# Patient Record
Sex: FEMALE | Race: WHITE
[De-identification: names, ages, dates, MRNs, and addresses within clinical notes are randomized per-mention and may not be internally consistent; named-entity substitution may affect disease eponyms.]

---

## 2019-11-01 ENCOUNTER — HOSPITAL ENCOUNTER (EMERGENCY)
Dept: HOSPITAL 92 - ERS | Age: 59
Discharge: HOME | End: 2019-11-01
Payer: MEDICARE

## 2019-11-01 DIAGNOSIS — F32.9: ICD-10-CM

## 2019-11-01 DIAGNOSIS — F43.10: ICD-10-CM

## 2019-11-01 DIAGNOSIS — F41.9: ICD-10-CM

## 2019-11-01 DIAGNOSIS — F17.210: ICD-10-CM

## 2019-11-01 DIAGNOSIS — J18.9: Primary | ICD-10-CM

## 2019-11-01 LAB
ALBUMIN SERPL BCG-MCNC: 4.3 G/DL (ref 3.5–5)
ALP SERPL-CCNC: 94 U/L (ref 40–110)
ALT SERPL W P-5'-P-CCNC: 22 U/L (ref 8–55)
ANION GAP SERPL CALC-SCNC: 16 MMOL/L (ref 10–20)
AST SERPL-CCNC: 17 U/L (ref 5–34)
BASOPHILS # BLD AUTO: 0 THOU/UL (ref 0–0.2)
BASOPHILS NFR BLD AUTO: 0.5 % (ref 0–1)
BILIRUB SERPL-MCNC: 0.3 MG/DL (ref 0.2–1.2)
BUN SERPL-MCNC: 9 MG/DL (ref 9.8–20.1)
CALCIUM SERPL-MCNC: 9.7 MG/DL (ref 7.8–10.44)
CHLORIDE SERPL-SCNC: 103 MMOL/L (ref 98–107)
CO2 SERPL-SCNC: 25 MMOL/L (ref 22–29)
CREAT CL PREDICTED SERPL C-G-VRATE: 0 ML/MIN (ref 70–130)
EOSINOPHIL # BLD AUTO: 0 THOU/UL (ref 0–0.7)
EOSINOPHIL NFR BLD AUTO: 0.4 % (ref 0–10)
GLOBULIN SER CALC-MCNC: 3 G/DL (ref 2.4–3.5)
GLUCOSE SERPL-MCNC: 89 MG/DL (ref 70–105)
HGB BLD-MCNC: 13.8 G/DL (ref 12–16)
LEUKOCYTE ESTERASE UR QL STRIP.AUTO: 25 LEU/UL
LYMPHOCYTES # BLD: 2.3 THOU/UL (ref 1.2–3.4)
LYMPHOCYTES NFR BLD AUTO: 29.8 % (ref 21–51)
MCH RBC QN AUTO: 33.2 PG (ref 27–31)
MCV RBC AUTO: 97.5 FL (ref 78–98)
MONOCYTES # BLD AUTO: 0.5 THOU/UL (ref 0.11–0.59)
MONOCYTES NFR BLD AUTO: 6.3 % (ref 0–10)
NEUTROPHILS # BLD AUTO: 4.8 THOU/UL (ref 1.4–6.5)
NEUTROPHILS NFR BLD AUTO: 63 % (ref 42–75)
PLATELET # BLD AUTO: 241 THOU/UL (ref 130–400)
POTASSIUM SERPL-SCNC: 3.7 MMOL/L (ref 3.5–5.1)
RBC # BLD AUTO: 4.16 MILL/UL (ref 4.2–5.4)
RBC UR QL AUTO: (no result) HPF (ref 0–3)
SODIUM SERPL-SCNC: 140 MMOL/L (ref 136–145)
WBC # BLD AUTO: 7.7 THOU/UL (ref 4.8–10.8)
WBC UR QL AUTO: (no result) HPF (ref 0–3)

## 2019-11-01 PROCEDURE — 71275 CT ANGIOGRAPHY CHEST: CPT

## 2019-11-01 PROCEDURE — 72191 CT ANGIOGRAPH PELV W/O&W/DYE: CPT

## 2019-11-01 PROCEDURE — 81015 MICROSCOPIC EXAM OF URINE: CPT

## 2019-11-01 PROCEDURE — 80053 COMPREHEN METABOLIC PANEL: CPT

## 2019-11-01 PROCEDURE — 96372 THER/PROPH/DIAG INJ SC/IM: CPT

## 2019-11-01 PROCEDURE — 81003 URINALYSIS AUTO W/O SCOPE: CPT

## 2019-11-01 PROCEDURE — 71045 X-RAY EXAM CHEST 1 VIEW: CPT

## 2019-11-01 PROCEDURE — 85025 COMPLETE CBC W/AUTO DIFF WBC: CPT

## 2019-11-01 PROCEDURE — 74175 CTA ABDOMEN W/CONTRAST: CPT

## 2019-11-01 PROCEDURE — 36415 COLL VENOUS BLD VENIPUNCTURE: CPT

## 2019-11-01 NOTE — RAD
PORTABLE CHEST:

11/1/19

 

HISTORY: 

Cough and back pain. 

 

Heart size and mediastinum are within normal limits. The lungs are clear of infiltrates. No significa
nt bony findings.

 

IMPRESSION: 

No active intrathoracic disease. 

 

POS: [facility]

## 2019-11-27 ENCOUNTER — HOSPITAL ENCOUNTER (OUTPATIENT)
Dept: HOSPITAL 92 - BICMAMMO | Age: 59
Discharge: HOME | End: 2019-11-27
Attending: FAMILY MEDICINE
Payer: MEDICARE

## 2019-11-27 DIAGNOSIS — Z13.820: ICD-10-CM

## 2019-11-27 DIAGNOSIS — Z80.3: ICD-10-CM

## 2019-11-27 DIAGNOSIS — Z12.31: Primary | ICD-10-CM

## 2019-11-27 DIAGNOSIS — M85.80: ICD-10-CM

## 2019-11-27 PROCEDURE — 77067 SCR MAMMO BI INCL CAD: CPT

## 2019-11-27 PROCEDURE — 77063 BREAST TOMOSYNTHESIS BI: CPT

## 2019-11-27 PROCEDURE — 77080 DXA BONE DENSITY AXIAL: CPT

## 2019-11-27 NOTE — BD
EXAM: DEXA bone density examination



HISTORY: 59-year-old postmenopausal female for screening



COMPARISON: None



FINDINGS:

L1--bone mineral density 0.894 g/sq cm; T score -0.9

L2--bone mineral density 0.956 g/sq cm; T score -0.7

L3--bone mineral density 0.940 g/sq cm; T score -1.3

L4--bone mineral density 0.893 g/sq cm; T score -1.5

Total L1-L4--bone mineral density 0.921 g/sq cm; T score -1.1



Left femoral neck--bone mineral density0.789; T score -0.5

Total proximal left femur--bone mineral density 1.004; T score 0.5



IMPRESSION: Osteopenia. 



Reported By: Phill Faye 

Electronically Signed:  11/27/2019 10:40 AM

## 2019-12-06 ENCOUNTER — HOSPITAL ENCOUNTER (OUTPATIENT)
Dept: HOSPITAL 92 - BICRAD | Age: 59
Discharge: HOME | End: 2019-12-06
Attending: FAMILY MEDICINE
Payer: MEDICARE

## 2019-12-06 DIAGNOSIS — M47.814: ICD-10-CM

## 2019-12-06 DIAGNOSIS — M54.9: Primary | ICD-10-CM

## 2019-12-06 PROCEDURE — 36415 COLL VENOUS BLD VENIPUNCTURE: CPT

## 2019-12-06 PROCEDURE — 72070 X-RAY EXAM THORAC SPINE 2VWS: CPT

## 2019-12-06 PROCEDURE — 80053 COMPREHEN METABOLIC PANEL: CPT

## 2019-12-06 PROCEDURE — 86803 HEPATITIS C AB TEST: CPT

## 2019-12-06 PROCEDURE — 80061 LIPID PANEL: CPT

## 2019-12-06 NOTE — RAD
THORACIC SPINE THREE VIEWS:

 

HISTORY:

Back pain.

 

FINDINGS:

No fracture, subluxation or bony destruction is seen. There are mild degenerative changes in the thor
acic spine.

 

POS: [facility]

## 2019-12-09 NOTE — MMO
Bilateral MAMMO Bilat Screen DDI+GERRY.

 

CLINICAL HISTORY:

Patient is 59 years old and is seen for screening. The patient has the following

family history of breast cancer:  maternal aunt, malignant (generic) and

paternal aunt, malignant (generic).  The patient has no personal history of

cancer.

 

VIEWS:

The views performed were:  bilateral craniocaudal with tomosynthesis; bilateral

mediolateral oblique with tomosynthesis; and right craniocaudal.

 

FILMS COMPARED:

The present examination has been compared to a prior imaging study performed at

[facility] on 09/18/2015.

 

This study has been interpreted with the assistance of computer-aided detection.

 

MAMMOGRAM FINDINGS:

There are scattered fibroglandular densities.

 

There are no suspicious masses, suspicious calcifications, or new areas of

architectural distortion.

 

IMPRESSION:

THERE IS NO MAMMOGRAPHIC EVIDENCE OF MALIGNANCY.

 

A ROUTINE FOLLOW-UP MAMMOGRAM IN 1 YEAR IS RECOMMENDED.

 

THE RESULTS OF THIS EXAM WERE SENT TO THE PATIENT.

 

ACR BI-RADS Category 1 - Negative

 

MAMMOGRAPHY NOTE:

 1. A negative mammogram report should not delay a biopsy if a dominant of

 clinically suspicious mass is present.

 2. Approximately 10% to 15% of breast cancers are not detected by

 mammography.

 3. Adenosis and dense breasts may obscure an underlying neoplasm.

 

 

Reported by: MAGGIE DORMAN MD

Electonically Signed: [id number]

## 2021-01-12 ENCOUNTER — HOSPITAL ENCOUNTER (OUTPATIENT)
Dept: HOSPITAL 92 - ERS | Age: 61
Setting detail: OBSERVATION
LOS: 1 days | Discharge: HOME | End: 2021-01-13
Attending: INTERNAL MEDICINE | Admitting: HOSPITALIST
Payer: MEDICARE

## 2021-01-12 VITALS — BODY MASS INDEX: 32.8 KG/M2

## 2021-01-12 DIAGNOSIS — E66.9: ICD-10-CM

## 2021-01-12 DIAGNOSIS — F41.9: ICD-10-CM

## 2021-01-12 DIAGNOSIS — Z88.8: ICD-10-CM

## 2021-01-12 DIAGNOSIS — F17.210: ICD-10-CM

## 2021-01-12 DIAGNOSIS — Z79.899: ICD-10-CM

## 2021-01-12 DIAGNOSIS — Z88.5: ICD-10-CM

## 2021-01-12 DIAGNOSIS — F32.9: ICD-10-CM

## 2021-01-12 DIAGNOSIS — F43.10: ICD-10-CM

## 2021-01-12 DIAGNOSIS — R07.9: Primary | ICD-10-CM

## 2021-01-12 DIAGNOSIS — Z20.822: ICD-10-CM

## 2021-01-12 LAB
ALBUMIN SERPL BCG-MCNC: 4 G/DL (ref 3.5–5)
ALP SERPL-CCNC: 106 U/L (ref 40–110)
ALT SERPL W P-5'-P-CCNC: 21 U/L (ref 8–55)
ANION GAP SERPL CALC-SCNC: 13 MMOL/L (ref 10–20)
AST SERPL-CCNC: 12 U/L (ref 5–34)
BASOPHILS # BLD AUTO: 0.1 THOU/UL (ref 0–0.2)
BASOPHILS NFR BLD AUTO: 1 % (ref 0–1)
BILIRUB SERPL-MCNC: 0.2 MG/DL (ref 0.2–1.2)
BUN SERPL-MCNC: 8 MG/DL (ref 9.8–20.1)
CALCIUM SERPL-MCNC: 9 MG/DL (ref 7.8–10.44)
CHLORIDE SERPL-SCNC: 106 MMOL/L (ref 98–107)
CO2 SERPL-SCNC: 26 MMOL/L (ref 22–29)
CREAT CL PREDICTED SERPL C-G-VRATE: 0 ML/MIN (ref 70–130)
EOSINOPHIL # BLD AUTO: 0 THOU/UL (ref 0–0.7)
EOSINOPHIL NFR BLD AUTO: 0.4 % (ref 0–10)
GLOBULIN SER CALC-MCNC: 2.7 G/DL (ref 2.4–3.5)
GLUCOSE SERPL-MCNC: 80 MG/DL (ref 70–105)
HGB BLD-MCNC: 14 G/DL (ref 12–16)
LYMPHOCYTES # BLD: 2.4 THOU/UL (ref 1.2–3.4)
LYMPHOCYTES NFR BLD AUTO: 37 % (ref 21–51)
MCH RBC QN AUTO: 32.4 PG (ref 27–31)
MCV RBC AUTO: 98.9 FL (ref 78–98)
MONOCYTES # BLD AUTO: 0.5 THOU/UL (ref 0.11–0.59)
MONOCYTES NFR BLD AUTO: 6.9 % (ref 0–10)
NEUTROPHILS # BLD AUTO: 3.6 THOU/UL (ref 1.4–6.5)
NEUTROPHILS NFR BLD AUTO: 54.7 % (ref 42–75)
PLATELET # BLD AUTO: 230 THOU/UL (ref 130–400)
POTASSIUM SERPL-SCNC: 4.2 MMOL/L (ref 3.5–5.1)
RBC # BLD AUTO: 4.32 MILL/UL (ref 4.2–5.4)
SODIUM SERPL-SCNC: 141 MMOL/L (ref 136–145)
TROPONIN I SERPL DL<=0.01 NG/ML-MCNC: (no result) NG/ML (ref ?–0.03)
TROPONIN I SERPL DL<=0.01 NG/ML-MCNC: (no result) NG/ML (ref ?–0.03)
WBC # BLD AUTO: 6.5 THOU/UL (ref 4.8–10.8)

## 2021-01-12 PROCEDURE — 83036 HEMOGLOBIN GLYCOSYLATED A1C: CPT

## 2021-01-12 PROCEDURE — 84443 ASSAY THYROID STIM HORMONE: CPT

## 2021-01-12 PROCEDURE — 78452 HT MUSCLE IMAGE SPECT MULT: CPT

## 2021-01-12 PROCEDURE — 80061 LIPID PANEL: CPT

## 2021-01-12 PROCEDURE — A9500 TC99M SESTAMIBI: HCPCS

## 2021-01-12 PROCEDURE — 87635 SARS-COV-2 COVID-19 AMP PRB: CPT

## 2021-01-12 PROCEDURE — 80053 COMPREHEN METABOLIC PANEL: CPT

## 2021-01-12 PROCEDURE — 71045 X-RAY EXAM CHEST 1 VIEW: CPT

## 2021-01-12 PROCEDURE — 85379 FIBRIN DEGRADATION QUANT: CPT

## 2021-01-12 PROCEDURE — 80048 BASIC METABOLIC PNL TOTAL CA: CPT

## 2021-01-12 PROCEDURE — 36415 COLL VENOUS BLD VENIPUNCTURE: CPT

## 2021-01-12 PROCEDURE — U0003 INFECTIOUS AGENT DETECTION BY NUCLEIC ACID (DNA OR RNA); SEVERE ACUTE RESPIRATORY SYNDROME CORONAVIRUS 2 (SARS-COV-2) (CORONAVIRUS DISEASE [COVID-19]), AMPLIFIED PROBE TECHNIQUE, MAKING USE OF HIGH THROUGHPUT TECHNOLOGIES AS DESCRIBED BY CMS-2020-01-R: HCPCS

## 2021-01-12 PROCEDURE — 99285 EMERGENCY DEPT VISIT HI MDM: CPT

## 2021-01-12 PROCEDURE — 93017 CV STRESS TEST TRACING ONLY: CPT

## 2021-01-12 PROCEDURE — 84484 ASSAY OF TROPONIN QUANT: CPT

## 2021-01-12 PROCEDURE — G0378 HOSPITAL OBSERVATION PER HR: HCPCS

## 2021-01-12 PROCEDURE — 85025 COMPLETE CBC W/AUTO DIFF WBC: CPT

## 2021-01-12 PROCEDURE — 93005 ELECTROCARDIOGRAM TRACING: CPT

## 2021-01-12 NOTE — RAD
XR Chest 1 View Portable



History: Chest pain



Comparison: Radiograph 2019



Findings: Lungs are clear. No pneumothorax or effusion. Cardiac silhouette and mediastinal contours a
re within normal limits. No acute osseous abnormality.



Impression: No acute intrathoracic abnormality.



Reported By: Prem Lacey 

Electronically Signed:  1/12/2021 10:38 AM

## 2021-01-12 NOTE — HP
CHIEF COMPLAINT:  Chest pain.



HISTORY OF PRESENT ILLNESS:  A 60-year-old female with a past medical history of

PTSD, presenting with the chest pain of three day duration.  Mostly sharp in

quality.  Associated with nausea.  No emesis.  The patient does have a history 
of

hot flashes.  She had a similar episode of chest pain in December 2019 and

 stress test done which was negative at that time.  The pain is mostly in the

center of her chest radiating to the side of her neck and shoulder.  No recent

fever, night sweats, chills, productive cough.  No sick exposure.  She is a 
daily

smoker about a pack a day.  Her initial evaluation showed troponin is negative. 
EKG

showed normal sinus rhythm.  The patient received aspirin and nitroglycerin in 
the

ER.  The patient will be brought in for risk stratification. 



REVIEW OF SYSTEMS:  13-point review of systems reviewed with the patient and

pertinence addressed in the history of present illness.  The rest are negative

including no recent fever, chills, productive cough.  No abdominal pain,

constipation, diarrhea, hematuria, dysuria, or hematochezia.  Denies headache,

tingling, numbness, weakness in her extremities.  No polyuria or polydipsia.  No

rash. 



PAST MEDICAL HISTORY:  PTSD and anxiety.



SURGICAL HISTORY:  Colectomy and hysterectomy, history of ectopic pregnancy.



PSYCHIATRIC HISTORY:  PTSD, anxiety, depression. 



She was admitted for an inpatient psych evaluation in 2017. 



She smokes at least a pack a day.  Drinks occasionally.  Lives at home with 
daughter

and grand children and she is a  at [employer]'s. 



FAMILY HISTORY:  Noncontributory.



ALLERGIES:  SHE IS ALLERGIC TO CODEINE AND SULFATE.



PHYSICAL EXAMINATION:

VITAL SIGNS:  Her temperature is 98.8, pulse 65, blood pressure 135/75, 
saturating

99% on room air. 

GENERAL:  The patient is alert, oriented, nontoxic appearing. 

HEENT:  Pupils are equal, round, and reactive to light.  Anicteric.  Mucous

membranes moist. 

CARDIOVASCULAR:  Regular rate and rhythm without murmurs, rubs, or gallops. 

LUNGS:  Clear to auscultation bilaterally without wheezing, rales, or rhonchi. 

ABDOMEN:  Soft, nontender, nondistended.  Good bowel sounds. 

EXTREMITIES:  Without pitting edema. 

NEUROLOGICAL:  No focal deficits. 

PSYCHIATRIC:  Appropriate mood and affect.



DIAGNOSTIC STUDIES:  EKG showed sinus bradycardia at 54 beats per minute.  No 
ST-T

wave changes. 



IMPRESSION AND PLAN:  This is a 60-year-old female without significant past 
medical

history of PTSD, presenting with chest pain.  She will be ruled out with

serial troponin, TSH, ANC, lipid panel.  Daily aspirin.  Follow up with lipid 
panel.

 

Nuclear medicine stress test in the morning.  Keep her n.p.o. after midnight.







Job ID:  [id number]



MTDD

## 2021-01-13 VITALS — TEMPERATURE: 99.1 F | SYSTOLIC BLOOD PRESSURE: 117 MMHG | DIASTOLIC BLOOD PRESSURE: 77 MMHG

## 2021-01-13 LAB
ANION GAP SERPL CALC-SCNC: 13 MMOL/L (ref 10–20)
BASOPHILS # BLD AUTO: 0.1 THOU/UL (ref 0–0.2)
BASOPHILS NFR BLD AUTO: 0.7 % (ref 0–1)
BUN SERPL-MCNC: 17 MG/DL (ref 9.8–20.1)
CALCIUM SERPL-MCNC: 9.1 MG/DL (ref 7.8–10.44)
CHD RISK SERPL-RTO: 5.2 (ref ?–4.5)
CHLORIDE SERPL-SCNC: 104 MMOL/L (ref 98–107)
CHOLEST SERPL-MCNC: 213 MG/DL
CO2 SERPL-SCNC: 28 MMOL/L (ref 22–29)
CREAT CL PREDICTED SERPL C-G-VRATE: 102 ML/MIN (ref 70–130)
EOSINOPHIL # BLD AUTO: 0 THOU/UL (ref 0–0.7)
EOSINOPHIL NFR BLD AUTO: 0.7 % (ref 0–10)
GLUCOSE SERPL-MCNC: 91 MG/DL (ref 70–105)
HDLC SERPL-MCNC: 41 MG/DL
HGB BLD-MCNC: 12.9 G/DL (ref 12–16)
LDLC SERPL CALC-MCNC: 139 MG/DL
LYMPHOCYTES # BLD: 2.6 THOU/UL (ref 1.2–3.4)
LYMPHOCYTES NFR BLD AUTO: 35.7 % (ref 21–51)
MCH RBC QN AUTO: 32.9 PG (ref 27–31)
MCV RBC AUTO: 98.7 FL (ref 78–98)
MONOCYTES # BLD AUTO: 0.5 THOU/UL (ref 0.11–0.59)
MONOCYTES NFR BLD AUTO: 7.5 % (ref 0–10)
NEUTROPHILS # BLD AUTO: 4 THOU/UL (ref 1.4–6.5)
NEUTROPHILS NFR BLD AUTO: 55.4 % (ref 42–75)
PLATELET # BLD AUTO: 209 THOU/UL (ref 130–400)
POTASSIUM SERPL-SCNC: 4.8 MMOL/L (ref 3.5–5.1)
RBC # BLD AUTO: 3.93 MILL/UL (ref 4.2–5.4)
SODIUM SERPL-SCNC: 140 MMOL/L (ref 136–145)
TRIGL SERPL-MCNC: 164 MG/DL (ref ?–150)
WBC # BLD AUTO: 7.2 THOU/UL (ref 4.8–10.8)

## 2021-01-13 NOTE — PDOC.DS.DS
Provider





- Provider


Date of Admission: 


01/12/21 15:04





Date of Discharge: 01/13/21


Admitting Provider: 


Morro Lopez MD





Primary Care Physician: 


Aspen Berry








Course





- Hospital Course


Hospital Course: 


Patient is a 60 year old  female with a PMH of tobacco smoking, 

obesity, PTSD and anxiety disorder.  She was admitted for ACS work up after she 

presented with chest pain.  She underwent a nuclear stress test, which ruled out

any active obstructive disease.  Her LVEF was 73%. Due to her high 10-year 

cardiovascular disease risk, I will go ahead and discharge her on moderate 

intensity statin, along with low dose aspirin. 





- Labs


Lab Results: 


                                        





                                 01/13/21 04:26 





                                 01/13/21 04:26 





Abnormal Lab Results - Last 48 hrs





01/12/21 10:24: BUN 8 L


01/12/21 10:24: MCV 98.9 H, MCH 32.4 H


01/13/21 04:26: Triglycerides 164 H, Cholesterol 213 H


01/13/21 04:26: RBC 3.93 L, MCV 98.7 H, MCH 32.9 H











- Physical Exam


Vitals: 


                             Vital Signs (12 hours)











  Temp Pulse Resp BP Pulse Ox


 


 01/13/21 07:10  97.7 F  48 L  17  114/59 L  98








                                     Weight











Weight                         185 lb 3.2 oz














Physical Exam: 


The patient was seen and examined on the day of discharge.





General: alert and awake, in no acute distress


HEENT: AT/NC. EOMi


Pulm: lungs are CTA, b/l. No wheezing or crackles


CVS: Normal S1S2. RRR


Abdomen: soft, NT, ND.  


Ext: w/o edema.  Good ROM in all 4 extremities


Skin: warm and well perfused


Psych: mood and affect appropriate





Problem





- Discharge Plan


Assessment: 


Please refer to clinical course





Plan





- Discharge Medications


Prescriptions: 


Aspirin [Ecotrin Low Strength] 81 mg PO DAILY #30 tab


Atorvastatin Calcium [Lipitor] 20 mg PO HS #30 tab


Home Medications: 


                                        











 Medication  Instructions  Recorded  Confirmed  Type


 


Melatonin 10 mg PO HS PRN 01/12/21 01/12/21 History


 


buPROPion [Wellbutrin] 75 mg PO BID 01/12/21 01/12/21 History


 


busPIRone HCl [Buspar] 10 mg PO DAILY PRN 01/12/21 01/12/21 History


 


Aspirin [Ecotrin Low Strength] 81 mg PO DAILY #30 tab 01/13/21  Rx


 


Atorvastatin Calcium [Lipitor] 20 mg PO HS #30 tab 01/13/21  Rx


 


Melatonin 9 mg PO HSPRN PRN  tab 01/13/21  Rx


 


buPROPion [Wellbutrin] 75 mg PO BID  tab 01/13/21  Rx


 


busPIRone HCl [Buspar] 10 mg PO DAILYPRN PRN  tab 01/13/21  Rx











Allergies: 








codeine Allergy (Verified 01/12/21 15:20)


   


meperidine [From Demerol] Allergy (Verified 01/12/21 15:20)


   


morphine Allergy (Verified 01/12/21 15:20)


   








- Follow up Plan


Referrals: 


Aspen York PA [Primary Care Provider] - 


Disposition: HOME





Quality





- Care Measures


CORE MEASURES:: N/A





- Stroke/TIA


Did you prescribe antithrombotic therapy?: Yes

## 2021-01-13 NOTE — NM
Exam: Nuclear medicine cardiac stress with wall motion



HISTORY: Chest pain



TECHNIQUE: Patient was administered 32.50 mCi of technetium 99m sestamibi intravenously

Cardiac gating is performed



FINDINGS: Stress only images demonstrate homogeneous distribution of the radiotracer

End-diastolic volume is 75 mL

End-systolic volume is 20 mL

Cardiac gating: Normal wall motion and thickening. 73% ejection fraction



IMPRESSION:

1. Homogeneous distribution of radiotracer

2. 73% ejection fraction



Reported By: David Mccullough 

Electronically Signed:  1/13/2021 11:17 AM

## 2021-01-16 NOTE — EKG
Test Reason : ANXIETY, CP

Blood Pressure : ***/*** mmHG

Vent. Rate : 054 BPM     Atrial Rate : 054 BPM

   P-R Int : 208 ms          QRS Dur : 090 ms

    QT Int : 424 ms       P-R-T Axes : 045 002 045 degrees

   QTc Int : 402 ms

 

Sinus bradycardia

Low voltage QRS

Borderline ECG

 

Confirmed by TOMAS SCHILLING DO ([id number]),  CLEMENT SMITH ([id number]) on 1/16/2021 2:00:44 PM

 

Referred By:             Confirmed By:TOMAS SCHILLING DO

## 2021-08-05 NOTE — CT
Pulmonary nodules are unchanged.  Mild enlargement of lymph nodes unchanged.    8.2021    All Radiology images reviewed independently from Radiology interpretation.    2.35        2.38      2.56      2.70      2.73      2.80              4.2021                                                        I have spent an additional thirty five (35) minutes on patient's indirect care reviewing medical records and imaging.    Trung Richardson MD      CT angiogram of chest and abdomen performed with intravenous contrast enhancement with 3-D reconstruc
tions



HISTORY: Mid back pain.



COMPARISON: None.



FINDINGS: The lungs show a mixture of groundglass opacity and areas which appear to represent air tra
pping. No confluent infiltrative process is seen. No pleural effusion.



No significant mediastinal or hilar adenopathy. The pulmonary arteries are fairly well opacified and 
show no evidence for pulmonary embolus. The thoracic aorta is normal in caliber.



A small hiatal hernia is present. The liver spleen and pancreas regions appear unremarkable on this a
ngiographic phase exam. The gallbladder has been removed. The right and left adrenal glands are

normal. There is cortical scarring involving the left kidney. No obstruction.



The abdominal aorta is normal in caliber no dissection. No stenosis. The superior mesenteric and infe
rior mesenteric arteries are patent without stenosis. The celiac artery is normal. Renal arteries

are also unremarkable.



IMPRESSION: No evidence of aortic aneurysm or dissection



Reported By: Abdias Lee 

Electronically Signed:  11/1/2019 8:55 PM

## 2021-09-07 ENCOUNTER — HOSPITAL ENCOUNTER (OUTPATIENT)
Dept: HOSPITAL 92 - BICRAD | Age: 61
Discharge: HOME | End: 2021-09-07
Attending: FAMILY MEDICINE
Payer: MEDICARE

## 2021-09-07 DIAGNOSIS — M54.6: Primary | ICD-10-CM

## 2021-09-07 PROCEDURE — 72072 X-RAY EXAM THORAC SPINE 3VWS: CPT
